# Patient Record
Sex: FEMALE | Race: BLACK OR AFRICAN AMERICAN
[De-identification: names, ages, dates, MRNs, and addresses within clinical notes are randomized per-mention and may not be internally consistent; named-entity substitution may affect disease eponyms.]

---

## 2019-07-21 ENCOUNTER — HOSPITAL ENCOUNTER (EMERGENCY)
Dept: HOSPITAL 26 - MED | Age: 24
Discharge: HOME | End: 2019-07-21
Payer: MEDICAID

## 2019-07-21 VITALS — WEIGHT: 105 LBS | HEIGHT: 63 IN | BODY MASS INDEX: 18.61 KG/M2

## 2019-07-21 VITALS — DIASTOLIC BLOOD PRESSURE: 77 MMHG | SYSTOLIC BLOOD PRESSURE: 121 MMHG

## 2019-07-21 VITALS — DIASTOLIC BLOOD PRESSURE: 76 MMHG | SYSTOLIC BLOOD PRESSURE: 113 MMHG

## 2019-07-21 DIAGNOSIS — R06.02: ICD-10-CM

## 2019-07-21 DIAGNOSIS — R07.89: Primary | ICD-10-CM

## 2019-07-21 LAB
ALBUMIN FLD-MCNC: 3.3 G/DL (ref 3.4–5)
ANION GAP SERPL CALCULATED.3IONS-SCNC: 8.1 MMOL/L (ref 8–16)
AST SERPL-CCNC: 28 U/L (ref 15–37)
BILIRUB SERPL-MCNC: 0.3 MG/DL (ref 0–1)
BUN SERPL-MCNC: 14 MG/DL (ref 7–18)
CHLORIDE SERPL-SCNC: 106 MMOL/L (ref 98–107)
CO2 SERPL-SCNC: 27.8 MMOL/L (ref 21–32)
CREAT SERPL-MCNC: 0.8 MG/DL (ref 0.6–1.3)
EOSINOPHIL NFR BLD MANUAL: 4 % (ref 0–4)
ERYTHROCYTE [DISTWIDTH] IN BLOOD BY AUTOMATED COUNT: 13.1 % (ref 11.6–13.7)
GFR SERPL CREATININE-BSD FRML MDRD: 115 ML/MIN (ref 90–?)
GLUCOSE SERPL-MCNC: 83 MG/DL (ref 74–106)
HCT VFR BLD AUTO: 37 % (ref 36–48)
HGB BLD-MCNC: 12.6 G/DL (ref 12–16)
LIPASE SERPL-CCNC: 421 U/L (ref 73–393)
LYMPHOCYTES NFR BLD MANUAL: 53 % (ref 20–46)
MCH RBC QN AUTO: 31 PG (ref 27–31)
MCHC RBC AUTO-ENTMCNC: 34 G/DL (ref 33–37)
MCV RBC AUTO: 92.5 FL (ref 80–94)
MONOCYTES NFR BLD MANUAL: 3 % (ref 5–12)
PLATELET # BLD AUTO: 193 K/UL (ref 140–450)
POTASSIUM SERPL-SCNC: 3.9 MMOL/L (ref 3.5–5.1)
RBC # BLD AUTO: 4 MIL/UL (ref 4.2–5.4)
SODIUM SERPL-SCNC: 138 MMOL/L (ref 136–145)
WBC # BLD AUTO: 3.8 K/UL (ref 4.8–10.8)

## 2019-07-21 PROCEDURE — 71046 X-RAY EXAM CHEST 2 VIEWS: CPT

## 2019-07-21 PROCEDURE — 85025 COMPLETE CBC W/AUTO DIFF WBC: CPT

## 2019-07-21 PROCEDURE — 93005 ELECTROCARDIOGRAM TRACING: CPT

## 2019-07-21 PROCEDURE — 99284 EMERGENCY DEPT VISIT MOD MDM: CPT

## 2019-07-21 PROCEDURE — 80053 COMPREHEN METABOLIC PANEL: CPT

## 2019-07-21 PROCEDURE — 36415 COLL VENOUS BLD VENIPUNCTURE: CPT

## 2019-07-21 PROCEDURE — 81025 URINE PREGNANCY TEST: CPT

## 2019-07-21 PROCEDURE — 83690 ASSAY OF LIPASE: CPT

## 2019-07-21 NOTE — NUR
25 YO F BIB SELF PRESENTS TO ED C/O 7/10 SHARP RIGHT SIDE CHEST PAIN AND SOB 
SINCE FRIDAY. PT DENIES DIZZINESS, NVD, LIGHT HEADEDNESS, COUGH, RECENT INJURY 
OR RECENT ILLNESS. PT DENIES ALCOHOL OR RECREATIONAL DRUG USE.



-- PT AWAKE, ALERT, CALM, COOPERATIVE. ANSWERS QUESTIONS APPROPRIATELY. PT IS 
PLAYING ON PHONE.

-- SKIN NORMAL, WARM, DRY. BREATHING EVEN, UNLABORED. LUNGS CTA. SPO2 100% ON 
RA. NO INCREASED WOB NOTED.



PMH-- DENIES

RX-- DENIES

## 2020-09-01 ENCOUNTER — HOSPITAL ENCOUNTER (EMERGENCY)
Dept: HOSPITAL 26 - MED | Age: 25
Discharge: HOME | End: 2020-09-01
Payer: MEDICAID

## 2020-09-01 VITALS — DIASTOLIC BLOOD PRESSURE: 75 MMHG | SYSTOLIC BLOOD PRESSURE: 101 MMHG

## 2020-09-01 VITALS — HEIGHT: 62 IN | WEIGHT: 100 LBS | BODY MASS INDEX: 18.4 KG/M2

## 2020-09-01 VITALS — SYSTOLIC BLOOD PRESSURE: 103 MMHG | DIASTOLIC BLOOD PRESSURE: 78 MMHG

## 2020-09-01 DIAGNOSIS — H11.32: ICD-10-CM
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
## 2020-09-01 DIAGNOSIS — S05.01XA: Primary | ICD-10-CM
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
## 2020-09-01 NOTE — NUR
PT C/O HAILY EYE REDNESS SINCE YESTERDAY AND WORSENED TODAY S/P EYELASH 
EXTENSIONS ON LAST . PT DENIES ITCHINESS, PAIN, TEARING, OR BLURRY 
VISION, BUT FOREIGN BODY SENSATION ON THE RT EYE. PT ALSO DENIES COUGH, LIFTING 
HEAVY STUFF, CONSTIPATION. DENIES ANY COVID-19 RELATED SX OR SICK CONTACTS. 
REPORTS 16 WEEKS OF PREGNANCY. ALEXANDRA: 2021, A0. 

OS:20/20

OD: 20/20

OU: 20/20

## 2020-09-01 NOTE — NUR
Patient discharged with v/s stable. Written and verbal after care instructions 
given and explained. 

Patient alert, oriented and verbalized understanding of instructions. 
Ambulatory with steady gait. All questions addressed prior to discharge. ID 
band removed. Patient advised to follow up with PMD. Rx of ERYTHROMYCIN 
OINTMENT given. Patient educated on indication of medication including possible 
reaction and side effects. Opportunity to ask questions provided and answered.

## 2021-08-27 ENCOUNTER — HOSPITAL ENCOUNTER (EMERGENCY)
Dept: HOSPITAL 26 - MED | Age: 26
Discharge: HOME | End: 2021-08-27
Payer: MEDICAID

## 2021-08-27 VITALS — DIASTOLIC BLOOD PRESSURE: 73 MMHG | SYSTOLIC BLOOD PRESSURE: 116 MMHG

## 2021-08-27 VITALS — HEIGHT: 63 IN | WEIGHT: 110 LBS | BODY MASS INDEX: 19.49 KG/M2

## 2021-08-27 VITALS — SYSTOLIC BLOOD PRESSURE: 113 MMHG | DIASTOLIC BLOOD PRESSURE: 72 MMHG

## 2021-08-27 DIAGNOSIS — Z3A.01: ICD-10-CM

## 2021-08-27 DIAGNOSIS — O03.9: Primary | ICD-10-CM

## 2021-08-27 LAB
ALBUMIN FLD-MCNC: 3.7 G/DL (ref 3.4–5)
ANION GAP SERPL CALCULATED.3IONS-SCNC: 10.2 MMOL/L (ref 8–16)
APPEARANCE UR: (no result)
AST SERPL-CCNC: 11 U/L (ref 15–37)
BASOPHILS # BLD AUTO: 0 K/UL (ref 0–0.22)
BASOPHILS NFR BLD AUTO: 0.3 % (ref 0–2)
BILIRUB SERPL-MCNC: 0.4 MG/DL (ref 0–1)
BILIRUB UR QL STRIP: NEGATIVE
BUN SERPL-MCNC: 8 MG/DL (ref 7–18)
CHLORIDE SERPL-SCNC: 105 MMOL/L (ref 98–107)
CO2 SERPL-SCNC: 27.5 MMOL/L (ref 21–32)
COLOR UR: (no result)
CREAT SERPL-MCNC: 0.7 MG/DL (ref 0.6–1.3)
EOSINOPHIL # BLD AUTO: 0.1 K/UL (ref 0–0.4)
EOSINOPHIL NFR BLD AUTO: 1.8 % (ref 0–4)
ERYTHROCYTE [DISTWIDTH] IN BLOOD BY AUTOMATED COUNT: 15.9 % (ref 11.6–13.7)
GFR SERPL CREATININE-BSD FRML MDRD: 132 ML/MIN (ref 90–?)
GLUCOSE SERPL-MCNC: 100 MG/DL (ref 74–106)
GLUCOSE UR STRIP-MCNC: NEGATIVE MG/DL
HCT VFR BLD AUTO: 31 % (ref 36–48)
HGB BLD-MCNC: 10.2 G/DL (ref 12–16)
HGB UR QL STRIP: (no result)
LEUKOCYTE ESTERASE UR QL STRIP: (no result)
LYMPHOCYTES # BLD AUTO: 1.4 K/UL (ref 2.5–16.5)
LYMPHOCYTES NFR BLD AUTO: 19.6 % (ref 20.5–51.1)
MCH RBC QN AUTO: 28 PG (ref 27–31)
MCHC RBC AUTO-ENTMCNC: 33 G/DL (ref 33–37)
MCV RBC AUTO: 86.7 FL (ref 80–94)
MONOCYTES # BLD AUTO: 0.3 K/UL (ref 0.8–1)
MONOCYTES NFR BLD AUTO: 4.5 % (ref 1.7–9.3)
NEUTROPHILS # BLD AUTO: 5.4 K/UL (ref 1.8–7.7)
NEUTROPHILS NFR BLD AUTO: 73.8 % (ref 42.2–75.2)
NITRITE UR QL STRIP: NEGATIVE
PH UR STRIP: 8.5 [PH] (ref 5–9)
PLATELET # BLD AUTO: 304 K/UL (ref 140–450)
POTASSIUM SERPL-SCNC: 3.7 MMOL/L (ref 3.5–5.1)
PROTHROMBIN TIME: 10.1 SECS (ref 10.8–13.4)
RBC # BLD AUTO: 3.57 MIL/UL (ref 4.2–5.4)
RBC #/AREA URNS HPF: (no result) /HPF (ref 0–5)
SODIUM SERPL-SCNC: 139 MMOL/L (ref 136–145)
WBC # BLD AUTO: 7.2 K/UL (ref 4.8–10.8)
WBC,URINE: (no result) /HPF (ref 0–5)

## 2021-08-27 PROCEDURE — 87086 URINE CULTURE/COLONY COUNT: CPT

## 2021-08-27 PROCEDURE — 36415 COLL VENOUS BLD VENIPUNCTURE: CPT

## 2021-08-27 PROCEDURE — 86901 BLOOD TYPING SEROLOGIC RH(D): CPT

## 2021-08-27 PROCEDURE — 96374 THER/PROPH/DIAG INJ IV PUSH: CPT

## 2021-08-27 PROCEDURE — 86900 BLOOD TYPING SEROLOGIC ABO: CPT

## 2021-08-27 PROCEDURE — 85730 THROMBOPLASTIN TIME PARTIAL: CPT

## 2021-08-27 PROCEDURE — 81001 URINALYSIS AUTO W/SCOPE: CPT

## 2021-08-27 PROCEDURE — 76856 US EXAM PELVIC COMPLETE: CPT

## 2021-08-27 PROCEDURE — 85610 PROTHROMBIN TIME: CPT

## 2021-08-27 PROCEDURE — 96361 HYDRATE IV INFUSION ADD-ON: CPT

## 2021-08-27 PROCEDURE — 99284 EMERGENCY DEPT VISIT MOD MDM: CPT

## 2021-08-27 PROCEDURE — 85025 COMPLETE CBC W/AUTO DIFF WBC: CPT

## 2021-08-27 PROCEDURE — 80053 COMPREHEN METABOLIC PANEL: CPT

## 2021-08-27 RX ADMIN — KETOROLAC TROMETHAMINE ONE MG: 30 INJECTION, SOLUTION INTRAMUSCULAR; INTRAVENOUS at 21:31

## 2021-08-27 RX ADMIN — SODIUM CHLORIDE ONE MLS/HR: 9 INJECTION, SOLUTION INTRAVENOUS at 21:31

## 2021-08-27 RX ADMIN — SODIUM CHLORIDE ONE MG: 9 INJECTION, SOLUTION INTRAVENOUS at 21:32

## 2021-08-27 NOTE — NUR
25 YO/F BIB SELF W CO CONSTANT LOWER ABDOMINAL/PELVIC SHARP PAIN /10 WORSENS W 
TOUCH OR PRESSURE AND HEAVY BLEEDING W CLOTS S/P TAKING MEDICATION X10 DAYS FOR 
. PATIENT REPORTS BLEEDING STARTED LIGHTLY BUT BECAME HEAVY TODAY W 
BLOOD CLOTS SINCE 1830, REPORTS SOAKING X1 PAD AN HOUR W BLOOD. PATIENT DENIES 
DIZZINESS, N/V, OR URINARY PROBLEMS.  LMP 21. BOWEL SOUNDS PRESENT, 
ABDOMEN TENDER TO TOUCH. PATIENT LAYING IN BED, HOB ELEVATED, BED LOCKED IN 
LOWEST POSITION, X1 SIDERAIL UP. BREATHING EVEN AND UNLABORED. NAD NOTED, WILL 
CONTINUE TO MONITOR. 





PMH:DENIES

NKA

## 2021-08-27 NOTE — NUR
PATIENT REPORTS SHE IS DRIVING. ERMD MADE AWARE FOR CHANGE OF ORDER OF MORPHINE 
FOR PAIN. PATIENT DENIES NAUSEA AND REFUSED ZOFRAN AT THIS TIME.

## 2021-08-27 NOTE — NUR
Patient discharged with v/s stable BY . Written and verbal after care 
instructions given and explained BY . Patient alert, oriented and 
verbalized understanding of instructions. Ambulatory with steady gait. All 
questions addressed prior to discharge BY . ID band removed. Patient 
advised to follow up with PMD. Rx of NAPROXEN given BY . Patient 
educated on indication of medication including possible reaction and side 
effects BY . Opportunity to ask questions provided and answered BY 
.

## 2023-08-20 ENCOUNTER — HOSPITAL ENCOUNTER (EMERGENCY)
Dept: HOSPITAL 26 - MED | Age: 28
Discharge: HOME | End: 2023-08-20
Payer: MEDICAID

## 2023-08-20 VITALS — BODY MASS INDEX: 19.49 KG/M2 | HEIGHT: 63 IN | WEIGHT: 110 LBS

## 2023-08-20 VITALS
DIASTOLIC BLOOD PRESSURE: 82 MMHG | TEMPERATURE: 98 F | HEART RATE: 80 BPM | OXYGEN SATURATION: 100 % | SYSTOLIC BLOOD PRESSURE: 122 MMHG | RESPIRATION RATE: 18 BRPM

## 2023-08-20 VITALS
DIASTOLIC BLOOD PRESSURE: 89 MMHG | SYSTOLIC BLOOD PRESSURE: 126 MMHG | OXYGEN SATURATION: 100 % | HEART RATE: 87 BPM | TEMPERATURE: 98 F | RESPIRATION RATE: 18 BRPM

## 2023-08-20 DIAGNOSIS — N76.0: ICD-10-CM

## 2023-08-20 DIAGNOSIS — Z79.899: ICD-10-CM

## 2023-08-20 DIAGNOSIS — B96.89: ICD-10-CM

## 2023-08-20 DIAGNOSIS — N39.0: Primary | ICD-10-CM

## 2023-08-20 DIAGNOSIS — Z90.49: ICD-10-CM

## 2023-08-20 LAB
APPEARANCE UR: CLEAR
BACTERIA URNS QL MICRO: (no result) /HPF
BILIRUB UR QL STRIP: NEGATIVE
COLOR UR: YELLOW
GLUCOSE UR STRIP-MCNC: NEGATIVE MG/DL
HGB UR QL STRIP: NEGATIVE
LEUKOCYTE ESTERASE UR QL STRIP: (no result)
NITRITE UR QL STRIP: NEGATIVE
PH UR STRIP: 6.5 [PH] (ref 5–9)
PROT UR QL STRIP: NEGATIVE
RBC #/AREA URNS HPF: (no result) /HPF (ref 0–5)
SP GR UR STRIP: 1.02 (ref 1–1.03)
SQUAMOUS URNS QL MICRO: (no result) /LPF
UROBILINOGEN UR STRIP-MCNC: 1 EU/DL (ref 0.2–1)
WBC,URINE: (no result) /HPF (ref 0–5)